# Patient Record
Sex: MALE | Race: BLACK OR AFRICAN AMERICAN | NOT HISPANIC OR LATINO | Employment: FULL TIME | URBAN - METROPOLITAN AREA
[De-identification: names, ages, dates, MRNs, and addresses within clinical notes are randomized per-mention and may not be internally consistent; named-entity substitution may affect disease eponyms.]

---
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
## 2023-09-28 ENCOUNTER — HOSPITAL ENCOUNTER (EMERGENCY)
Facility: HOSPITAL | Age: 18
Discharge: HOME | End: 2023-09-28
Attending: EMERGENCY MEDICINE

## 2023-09-28 VITALS
HEIGHT: 70 IN | BODY MASS INDEX: 20.04 KG/M2 | TEMPERATURE: 98 F | WEIGHT: 140 LBS | DIASTOLIC BLOOD PRESSURE: 74 MMHG | HEART RATE: 76 BPM | RESPIRATION RATE: 16 BRPM | OXYGEN SATURATION: 98 % | SYSTOLIC BLOOD PRESSURE: 116 MMHG

## 2023-09-28 DIAGNOSIS — S01.511A LIP LACERATION, INITIAL ENCOUNTER: ICD-10-CM

## 2023-09-28 DIAGNOSIS — S01.81XA FACIAL LACERATION, INITIAL ENCOUNTER: ICD-10-CM

## 2023-09-28 DIAGNOSIS — S09.90XA TRAUMATIC INJURY OF HEAD, INITIAL ENCOUNTER: ICD-10-CM

## 2023-09-28 DIAGNOSIS — S06.0X0A CONCUSSION WITHOUT LOSS OF CONSCIOUSNESS, INITIAL ENCOUNTER: ICD-10-CM
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
## 2023-09-28 DIAGNOSIS — S01.112A LEFT EYELID LACERATION, INITIAL ENCOUNTER: ICD-10-CM

## 2023-09-28 LAB
ALBUMIN SERPL BCP-MCNC: 5.2 G/DL (ref 3.5–5.2)
ALP SERPL-CCNC: 80 U/L (ref 55–135)
ALT SERPL W/O P-5'-P-CCNC: 9 U/L (ref 10–44)
ANION GAP SERPL CALC-SCNC: 8 MMOL/L (ref 8–16)
AST SERPL-CCNC: 15 U/L (ref 10–40)
BASOPHILS # BLD AUTO: 0.06 K/UL (ref 0–0.2)
BASOPHILS NFR BLD: 0.7 % (ref 0–1.9)
BILIRUB SERPL-MCNC: 0.5 MG/DL (ref 0.1–1)
BILIRUB UR QL STRIP: NEGATIVE
BUN SERPL-MCNC: 14 MG/DL (ref 6–20)
CALCIUM SERPL-MCNC: 9.8 MG/DL (ref 8.7–10.5)
CHLORIDE SERPL-SCNC: 107 MMOL/L (ref 95–110)
CLARITY UR: CLEAR
CO2 SERPL-SCNC: 24 MMOL/L (ref 23–29)
COLOR UR: YELLOW
CREAT SERPL-MCNC: 0.9 MG/DL (ref 0.5–1.4)
DIFFERENTIAL METHOD: NORMAL
EOSINOPHIL # BLD AUTO: 0.1 K/UL (ref 0–0.5)
EOSINOPHIL NFR BLD: 1.1 % (ref 0–8)
ERYTHROCYTE [DISTWIDTH] IN BLOOD BY AUTOMATED COUNT: 11.9 % (ref 11.5–14.5)
EST. GFR  (NO RACE VARIABLE): >60 ML/MIN/1.73 M^2
GLUCOSE SERPL-MCNC: 151 MG/DL (ref 70–110)
GLUCOSE UR QL STRIP: NEGATIVE
HCT VFR BLD AUTO: 47.9 % (ref 40–54)
HGB BLD-MCNC: 15.9 G/DL (ref 14–18)
HGB UR QL STRIP: NEGATIVE
IMM GRANULOCYTES # BLD AUTO: 0.03 K/UL (ref 0–0.04)
IMM GRANULOCYTES NFR BLD AUTO: 0.4 % (ref 0–0.5)
INR PPP: 1.2 (ref 0.8–1.2)
KETONES UR QL STRIP: ABNORMAL
LEUKOCYTE ESTERASE UR QL STRIP: NEGATIVE
LYMPHOCYTES # BLD AUTO: 4 K/UL (ref 1–4.8)
LYMPHOCYTES NFR BLD: 46.3 % (ref 18–48)
MCH RBC QN AUTO: 29.9 PG (ref 27–31)
MCHC RBC AUTO-ENTMCNC: 33.2 G/DL (ref 32–36)
MCV RBC AUTO: 90 FL (ref 82–98)
MONOCYTES # BLD AUTO: 0.5 K/UL (ref 0.3–1)
MONOCYTES NFR BLD: 5.3 % (ref 4–15)
NEUTROPHILS # BLD AUTO: 4 K/UL (ref 1.8–7.7)
NEUTROPHILS NFR BLD: 46.2 % (ref 38–73)
NITRITE UR QL STRIP: NEGATIVE
NRBC BLD-RTO: 0 /100 WBC
PH UR STRIP: 8 [PH] (ref 5–8)
PLATELET # BLD AUTO: 338 K/UL (ref 150–450)
PMV BLD AUTO: 10.4 FL (ref 9.2–12.9)
POTASSIUM SERPL-SCNC: 3.4 MMOL/L (ref 3.5–5.1)
PROT SERPL-MCNC: 8 G/DL (ref 6–8.4)
PROT UR QL STRIP: ABNORMAL
PROTHROMBIN TIME: 13.3 SEC (ref 9–12.5)
RBC # BLD AUTO: 5.31 M/UL (ref 4.6–6.2)
SODIUM SERPL-SCNC: 139 MMOL/L (ref 136–145)
SP GR UR STRIP: 1.02 (ref 1–1.03)
URN SPEC COLLECT METH UR: ABNORMAL
UROBILINOGEN UR STRIP-ACNC: ABNORMAL EU/DL
WBC # BLD AUTO: 8.56 K/UL (ref 3.9–12.7)

## 2023-09-28 PROCEDURE — 25000003 PHARM REV CODE 250

## 2023-09-28 PROCEDURE — 80053 COMPREHEN METABOLIC PANEL: CPT

## 2023-09-28 PROCEDURE — 90715 TDAP VACCINE 7 YRS/> IM: CPT

## 2023-09-28 PROCEDURE — 90471 IMMUNIZATION ADMIN: CPT

## 2023-09-28 PROCEDURE — 63600175 PHARM REV CODE 636 W HCPCS

## 2023-09-28 PROCEDURE — 85025 COMPLETE CBC W/AUTO DIFF WBC: CPT

## 2023-09-28 PROCEDURE — 96376 TX/PRO/DX INJ SAME DRUG ADON: CPT

## 2023-09-28 PROCEDURE — 85610 PROTHROMBIN TIME: CPT

## 2023-09-28 PROCEDURE — 12052 INTMD RPR FACE/MM 2.6-5.0 CM: CPT

## 2023-09-28 PROCEDURE — 96375 TX/PRO/DX INJ NEW DRUG ADDON: CPT

## 2023-09-28 PROCEDURE — 96374 THER/PROPH/DIAG INJ IV PUSH: CPT

## 2023-09-28 PROCEDURE — 81003 URINALYSIS AUTO W/O SCOPE: CPT

## 2023-09-28 PROCEDURE — 12053 INTMD RPR FACE/MM 5.1-7.5 CM: CPT

## 2023-09-28 PROCEDURE — 12011 RPR F/E/E/N/L/M 2.5 CM/<: CPT

## 2023-09-28 PROCEDURE — 99285 EMERGENCY DEPT VISIT HI MDM: CPT | Mod: 25

## 2023-09-28 RX ORDER — CEPHALEXIN 500 MG/1
500 CAPSULE ORAL 4 TIMES DAILY
Qty: 28 CAPSULE | Refills: 0 | Status: SHIPPED | OUTPATIENT
Start: 2023-09-28 | End: 2023-10-05

## 2023-09-28 RX ORDER — MORPHINE SULFATE 2 MG/ML
2 INJECTION, SOLUTION INTRAMUSCULAR; INTRAVENOUS
Status: COMPLETED | OUTPATIENT
Start: 2023-09-28 | End: 2023-09-28

## 2023-09-28 RX ORDER — MUPIROCIN 20 MG/G
OINTMENT TOPICAL 3 TIMES DAILY
Qty: 15 G | Refills: 0 | Status: SHIPPED | OUTPATIENT
Start: 2023-09-28 | End: 2023-10-03

## 2023-09-28 RX ORDER — KETOROLAC TROMETHAMINE 30 MG/ML
30 INJECTION, SOLUTION INTRAMUSCULAR; INTRAVENOUS
Status: DISCONTINUED | OUTPATIENT
Start: 2023-09-28 | End: 2023-09-28

## 2023-09-28 RX ORDER — LIDOCAINE HYDROCHLORIDE 10 MG/ML
10 INJECTION, SOLUTION EPIDURAL; INFILTRATION; INTRACAUDAL; PERINEURAL ONCE
Status: COMPLETED | OUTPATIENT
Start: 2023-09-28 | End: 2023-09-28

## 2023-09-28 RX ORDER — LIDOCAINE HYDROCHLORIDE AND EPINEPHRINE 10; 10 MG/ML; UG/ML
10 INJECTION, SOLUTION INFILTRATION; PERINEURAL
Status: DISCONTINUED | OUTPATIENT
Start: 2023-09-28 | End: 2023-09-28 | Stop reason: HOSPADM

## 2023-09-28 RX ORDER — BACITRACIN 500 [USP'U]/G
OINTMENT TOPICAL
Status: DISCONTINUED | OUTPATIENT
Start: 2023-09-28 | End: 2023-09-28 | Stop reason: HOSPADM

## 2023-09-28 RX ORDER — KETOROLAC TROMETHAMINE 60 MG/2ML
30 INJECTION, SOLUTION INTRAMUSCULAR ONCE
Status: DISCONTINUED | OUTPATIENT
Start: 2023-09-28 | End: 2023-09-28

## 2023-09-28 RX ORDER — ACETAMINOPHEN 500 MG
1000 TABLET ORAL
Status: COMPLETED | OUTPATIENT
Start: 2023-09-28 | End: 2023-09-28

## 2023-09-28 RX ORDER — KETOROLAC TROMETHAMINE 30 MG/ML
30 INJECTION, SOLUTION INTRAMUSCULAR; INTRAVENOUS ONCE
Status: DISCONTINUED | OUTPATIENT
Start: 2023-09-28 | End: 2023-09-28

## 2023-09-28 RX ORDER — ONDANSETRON 2 MG/ML
4 INJECTION INTRAMUSCULAR; INTRAVENOUS
Status: COMPLETED | OUTPATIENT
Start: 2023-09-28 | End: 2023-09-28

## 2023-09-28 RX ORDER — ERYTHROMYCIN 5 MG/G
OINTMENT OPHTHALMIC 4 TIMES DAILY
Qty: 3.5 G | Refills: 0 | Status: SHIPPED | OUTPATIENT
Start: 2023-09-28 | End: 2023-10-05

## 2023-09-28 RX ORDER — TETRACAINE HYDROCHLORIDE 5 MG/ML
2 SOLUTION OPHTHALMIC
Status: COMPLETED | OUTPATIENT
Start: 2023-09-28 | End: 2023-09-28

## 2023-09-28 RX ORDER — MORPHINE SULFATE 4 MG/ML
4 INJECTION, SOLUTION INTRAMUSCULAR; INTRAVENOUS
Status: COMPLETED | OUTPATIENT
Start: 2023-09-28 | End: 2023-09-28

## 2023-09-28 RX ADMIN — MORPHINE SULFATE 4 MG: 4 INJECTION, SOLUTION INTRAMUSCULAR; INTRAVENOUS at 12:09

## 2023-09-28 RX ADMIN — MORPHINE SULFATE 2 MG: 2 INJECTION, SOLUTION INTRAMUSCULAR; INTRAVENOUS at 08:09

## 2023-09-28 RX ADMIN — TETRACAINE HYDROCHLORIDE 2 DROP: 5 SOLUTION OPHTHALMIC at 02:09

## 2023-09-28 RX ADMIN — CLOSTRIDIUM TETANI TOXOID ANTIGEN (FORMALDEHYDE INACTIVATED), CORYNEBACTERIUM DIPHTHERIAE TOXOID ANTIGEN (FORMALDEHYDE INACTIVATED), BORDETELLA PERTUSSIS TOXOID ANTIGEN (GLUTARALDEHYDE INACTIVATED), BORDETELLA PERTUSSIS FILAMENTOUS HEMAGGLUTININ ANTIGEN (FORMALDEHYDE INACTIVATED), BORDETELLA PERTUSSIS PERTACTIN ANTIGEN, AND BORDETELLA PERTUSSIS FIMBRIAE 2/3 ANTIGEN 0.5 ML: 5; 2; 2.5; 5; 3; 5 INJECTION, SUSPENSION INTRAMUSCULAR at 03:09

## 2023-09-28 RX ADMIN — ACETAMINOPHEN 1000 MG: 500 TABLET ORAL at 12:09

## 2023-09-28 RX ADMIN — ONDANSETRON HYDROCHLORIDE 4 MG: 2 SOLUTION INTRAMUSCULAR; INTRAVENOUS at 08:09

## 2023-09-28 RX ADMIN — LIDOCAINE HYDROCHLORIDE 100 MG: 10 INJECTION, SOLUTION EPIDURAL; INFILTRATION; INTRACAUDAL; PERINEURAL at 01:09

## 2023-09-28 NOTE — ED PROVIDER NOTES
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 and/or Complexity of Data Reviewed  Labs: ordered.     Details: CBC WBC 8.56, RBC 5.31, hemoglobin 15.9, hematocrit 47.9, potassium 3.4, glucose 151, ALT 9, AST 15, PT 13.3, INR 1.2, UA significant for trace protein, trace ketones, urobilinogen 2.0-3.0, negative leukocytes, negative occult blood  Radiology: ordered.     Details: CT head significant for IMPRESSION: No acute intracranial process.  CT maxillofacial significant for IMPRESSION: 1. Multifocal soft tissue swelling around the left globe/orbit, lips and nose with radiopaque subcutaneous soft tissue debris/foreign bodies as above. 2. No acute displaced fracture identified.  CT cervical spine significant for IMPRESSION: No acute fracture or traumatic malalignment in the cervical spine.  X-ray chest significant for no acute cardiopulmonary process  X-ray left hand and left knee significant for no acute osseous findings  X-ray right hand significant for IMPRESSION: No acute osseous abnormalities. Deformity of the fifth metacarpal secondary to prior trauma     Risk  OTC drugs.  Prescription drug management.                               Clinical Impression:   Final diagnoses:  [W19.XXXA] Fall, initial encounter (Primary)  [S09.90XA]  Traumatic injury of head, initial encounter  [S01.81XA] Facial laceration, initial encounter  [S01.511A] Lip laceration, initial encounter  [S01.112A] Left eyelid laceration, initial encounter  [S06.0X0A] Concussion without loss of consciousness, initial encounter        ED Disposition Condition    Discharge Stable          ED Prescriptions       Medication Sig Dispense Start Date End Date Auth. Provider    cephALEXin (KEFLEX) 500 MG capsule Take 1 capsule (500 mg total) by mouth 4 (four) times daily. for 7 days 28 capsule 9/28/2023 10/5/2023 Gladis Sanchez NP    mupirocin (BACTROBAN) 2 % ointment Apply topically 3 (three) times daily. for 5 days 15 g 9/28/2023 10/3/2023 Gladis Sanchez NP    erythromycin (ROMYCIN) ophthalmic ointment Place into the left eye 4 (four) times daily. Place a 1/2 inch ribbon of ointment into the lower eyelid. for 7 days 3.5 g 9/28/2023 10/5/2023 Gladis Sanchez NP          Follow-up Information       Follow up With Specialties Details Why Contact Info Additional Information    [facility]  Schedule an appointment as soon as possible for a visit  For primary care, For recheck/continuing care [address] [address]  [phone]       [facility]  Schedule an appointment as soon as possible for a visit  For recheck/continuing care [address] [address]  [phone]     [facility] - Emergency Dept Emergency Medicine  If symptoms worsen [address] [place] [address] [phone] 1st floor             Gladis Sanchez NP  09/28/23 9997       Gladis Sanchez NP  09/29/23 3574

## 2023-09-28 NOTE — Clinical Note
"Tod Schulz" Beata was seen and treated in our emergency department on 9/28/2023.  He may return to work on 10/03/2023.       If you have any questions or concerns, please don't hesitate to call.      Gladis Sanchez NP"

## 2023-09-28 NOTE — DISCHARGE INSTRUCTIONS
Alternate Tylenol and ibuprofen as needed for pain.  Please take antibiotics as prescribed until gone.  Please apply antibacterial eyedrops into your left eye.  Apply topical Bactroban ointment to your abrasions and wounds.  Please follow up with [facility] for primary care and rechecked.  Please follow up with Ophthalmology at [facility] [place] [address] for your eye.  Your sutures will need to be removed in 5-7 days.  Please return to the ED for worsening pain, bleeding that will not stop, redness or swelling around any of your wounds, pus drainage from the wounds, fever, changes in vision, headaches, passing out, or any new or worsening concerns.

## 2024-11-01 ENCOUNTER — HOSPITAL ENCOUNTER (EMERGENCY)
Facility: HOSPITAL | Age: 19
Discharge: HOME | End: 2024-11-01
Attending: STUDENT IN AN ORGANIZED HEALTH CARE EDUCATION/TRAINING PROGRAM

## 2024-11-01 VITALS
HEART RATE: 68 BPM | RESPIRATION RATE: 18 BRPM | WEIGHT: 140 LBS | HEIGHT: 70 IN | OXYGEN SATURATION: 99 % | SYSTOLIC BLOOD PRESSURE: 130 MMHG | TEMPERATURE: 98 F | BODY MASS INDEX: 20.04 KG/M2 | DIASTOLIC BLOOD PRESSURE: 80 MMHG

## 2024-11-01 DIAGNOSIS — R52 PAIN: ICD-10-CM

## 2024-11-01 DIAGNOSIS — S16.1XXA CERVICAL STRAIN, ACUTE, INITIAL ENCOUNTER: Primary | ICD-10-CM

## 2024-11-01 PROCEDURE — 99283 EMERGENCY DEPT VISIT LOW MDM: CPT | Mod: 25

## 2024-11-01 NOTE — ED PROVIDER NOTES
Encounter Date: 2024       History     Chief Complaint   Patient presents with    Back Pain     X 1 MOS    AXILLA PAIN     BILAT     19-year-old male presents emergency department with complaint of neck pain.  Patient states that 1 month ago he was bending his neck down playing a video game in his had neck pain since that time.  Patient reports bending his neck makes the pain worse, or turning his head.  He has not really tried any over-the-counter medications he has not seen any medical professional prior to this visit.  Patient denies any weakness to his upper extremities or numbness or tingling    The history is provided by the patient.     Review of patient's allergies indicates:  No Known Allergies  Past Medical History:   Diagnosis Date    ADHD     ADHD (attention deficit hyperactivity disorder)     H/O umbilical hernia repair     at age 1     Past Surgical History:   Procedure Laterality Date    CIRCUMCISION, NON-       Family History   Problem Relation Name Age of Onset    No Known Problems Mother      No Known Problems Father      Hypertension Maternal Grandmother      Diabetes Maternal Grandmother      Congenital heart disease Neg Hx      Heart attacks under age 50 Neg Hx      Sudden death Neg Hx       Social History     Tobacco Use    Smoking status: Never    Smokeless tobacco: Never   Substance Use Topics    Alcohol use: Never    Drug use: Yes     Types: Marijuana     Review of Systems   Constitutional: Negative.    Respiratory: Negative.     Cardiovascular: Negative.    Genitourinary: Negative.    Musculoskeletal:  Positive for neck pain.   Neurological:  Negative for dizziness, tremors, seizures, syncope, facial asymmetry, speech difficulty, weakness, light-headedness, numbness and headaches.   Psychiatric/Behavioral: Negative.     All other systems reviewed and are negative.      Physical Exam     Initial Vitals [24 1113]   BP Pulse Resp Temp SpO2   129/85 65 16 98.2 °F (36.8 °C) 100  %      MAP       --         Physical Exam    Nursing note and vitals reviewed.  Constitutional: He appears well-developed and well-nourished.   HENT:   Head: Normocephalic and atraumatic.   Neck:       Has no midline tenderness on physical exam he has tenderness with palpation to the right lateral aspect of his neck that extends to the trapezius.  The patient's pain is reproducible with movement, or palpation.  He has equal upper extremity hand , he has no weakness his DTRs are intact.   Cardiovascular:  Normal rate, regular rhythm, normal heart sounds and intact distal pulses.           Pulmonary/Chest: Breath sounds normal.   Musculoskeletal:      Cervical back: No edema, erythema or rigidity. Muscular tenderness present. No spinous process tenderness. Decreased range of motion.     Neurological: He is alert and oriented to person, place, and time.         ED Course   Procedures  Labs Reviewed - No data to display       Imaging Results              X-Ray Cervical Spine Complete 5 view (Final result)  Result time 11/01/24 12:44:03      Final result by Mikael Smalls MD (11/01/24 12:44:03)                   Impression:      Negative cervical spine radiographs.      Electronically signed by: Mikael Smalls  Date:    11/01/2024  Time:    12:44               Narrative:    EXAMINATION:  XR CERVICAL SPINE COMPLETE 5 VIEW    CLINICAL HISTORY:  Neck pain, upper back pain, unspecified    FINDINGS:  Five views of the cervical spine with no prior studies for comparison show normal curvature and vertebral body alignment, with normal vertebral heights, and no acute fractures or destructive osseous lesions.    The intervertebral disc spaces are preserved. There is no significant facet or uncinate arthropathy. The odontoid, lateral masses and prevertebral soft tissues are normal, with normal bone mineralization.                                       Medications - No data to display  Medical Decision Making  19-year-old male  presents emergency department with complaint of neck pain.  Patient states that 1 month ago he was bending his neck down playing a video game in his had neck pain since that time.  Patient reports bending his neck makes the pain worse, or turning his head.  He has not really tried any over-the-counter medications he has not seen any medical professional prior to this visit.  Patient denies any weakness to his upper extremities or numbness or tingling    The history is provided by the patient.     Considerations include cervical strain, cervical radiculopathy, musculoskeletal pain    19-year-old patient presents emergency department with lingering pain to the right lateral aspect of his neck after bending down playing a video game.  At the time of my evaluation patient was noted to being in this same position on his cell phone he states that he does use his cell phone and play games often.  He has neuro exam is grossly intact he has no midline tenderness on physical examPhysical Exam   Has no midline tenderness on physical exam he has tenderness with palpation to the right lateral aspect of his neck that extends to the trapezius.  The patient's pain is reproducible with movement, or palpation.  He has equal upper extremity hand , he has no weakness .  Cervical spine imaging performed with no acute abnormalities.  Patient will be discharged home instructed on ice, Motrin for pain and swelling, given return precautions.      Amount and/or Complexity of Data Reviewed  Radiology: ordered. Decision-making details documented in ED Course.                                      Clinical Impression:  Final diagnoses:  [R52] Pain  [S16.1XXA] Cervical strain, acute, initial encounter (Primary)          ED Disposition Condition    Discharge Stable          ED Prescriptions    None       Follow-up Information       Follow up With Specialties Details Why Contact Info    Edel Cruz, NP General Practice Schedule an  appointment as soon as possible for a visit in 2 days  [address] [address] [address]  [phone]               Chelle Payton, JOE  11/01/24 3271

## 2024-11-01 NOTE — Clinical Note
"Tod Schulz" Beata was seen and treated in our emergency department on 11/1/2024.  He may return to work on 11/02/2024.       If you have any questions or concerns, please don't hesitate to call.      Lilia BUSTOS RN    "

## 2024-11-01 NOTE — DISCHARGE INSTRUCTIONS
Motrin or Tylenol for pain   Ice every 2 hours for 20 minutes   Follow up as directed   Return for any concerns   If pain is persistent you may need further outpatient testing and or physical therapy

## 2024-11-21 ENCOUNTER — HOSPITAL ENCOUNTER (EMERGENCY)
Facility: HOSPITAL | Age: 19
Discharge: HOME | End: 2024-11-21
Attending: EMERGENCY MEDICINE

## 2024-11-21 VITALS
RESPIRATION RATE: 18 BRPM | SYSTOLIC BLOOD PRESSURE: 131 MMHG | OXYGEN SATURATION: 99 % | BODY MASS INDEX: 20.04 KG/M2 | DIASTOLIC BLOOD PRESSURE: 60 MMHG | HEIGHT: 70 IN | TEMPERATURE: 98 F | WEIGHT: 140 LBS | HEART RATE: 94 BPM

## 2024-11-21 DIAGNOSIS — J06.9 VIRAL URI WITH COUGH: Primary | ICD-10-CM

## 2024-11-21 LAB
GROUP A STREP, MOLECULAR: NEGATIVE
HCV AB SERPL QL IA: NEGATIVE
HIV 1+2 AB+HIV1 P24 AG SERPL QL IA: NEGATIVE
INFLUENZA A, MOLECULAR: NEGATIVE
INFLUENZA B, MOLECULAR: NEGATIVE
SARS-COV-2 RDRP RESP QL NAA+PROBE: NEGATIVE
SPECIMEN SOURCE: NORMAL

## 2024-11-21 PROCEDURE — 86803 HEPATITIS C AB TEST: CPT | Performed by: EMERGENCY MEDICINE

## 2024-11-21 PROCEDURE — 87635 SARS-COV-2 COVID-19 AMP PRB: CPT | Performed by: NURSE PRACTITIONER

## 2024-11-21 PROCEDURE — 87389 HIV-1 AG W/HIV-1&-2 AB AG IA: CPT | Performed by: EMERGENCY MEDICINE

## 2024-11-21 PROCEDURE — 87502 INFLUENZA DNA AMP PROBE: CPT | Performed by: NURSE PRACTITIONER

## 2024-11-21 PROCEDURE — 99283 EMERGENCY DEPT VISIT LOW MDM: CPT

## 2024-11-21 PROCEDURE — 87651 STREP A DNA AMP PROBE: CPT | Performed by: NURSE PRACTITIONER

## 2024-11-21 NOTE — ED NOTES
Bed: [facility] 01 - C Chair  Expected date:   Expected time:   Means of arrival:   Comments:

## 2024-11-21 NOTE — Clinical Note
"Tod Schulz" Beata was seen and treated in our emergency department on 11/21/2024.  He may return to work on 11/22/2024.       If you have any questions or concerns, please don't hesitate to call.      Palmira Gillis NP"

## 2024-11-21 NOTE — ED PROVIDER NOTES
Encounter Date: 2024       History     Chief Complaint   Patient presents with    Cough    Chills    Sore Throat     Started last night    Generalized Body Aches     Tod Cota is a 19 year old male with pmh ADHD presenting to the ED with c/o sore throat, body aches, cough, congestion, and chills that began last night. He has taken OTC medications with improvement of symptoms. Denies vomiting and has been able to tolerate PO intake.       Review of patient's allergies indicates:  No Known Allergies  Past Medical History:   Diagnosis Date    ADHD     ADHD (attention deficit hyperactivity disorder)     H/O umbilical hernia repair     at age 1     Past Surgical History:   Procedure Laterality Date    CIRCUMCISION, NON-       Family History   Problem Relation Name Age of Onset    No Known Problems Mother      No Known Problems Father      Hypertension Maternal Grandmother      Diabetes Maternal Grandmother      Congenital heart disease Neg Hx      Heart attacks under age 50 Neg Hx      Sudden death Neg Hx       Social History     Tobacco Use    Smoking status: Never    Smokeless tobacco: Never   Substance Use Topics    Alcohol use: Never    Drug use: Yes     Types: Marijuana     Review of Systems   Constitutional:  Positive for chills. Negative for fever.   HENT:  Positive for congestion and sore throat.    Respiratory:  Positive for cough. Negative for shortness of breath.    Cardiovascular:  Negative for chest pain.   Gastrointestinal:  Negative for nausea and vomiting.   Genitourinary:  Negative for dysuria.   Musculoskeletal:  Positive for myalgias. Negative for back pain.   Skin:  Negative for rash.   Neurological:  Negative for weakness.   Hematological:  Does not bruise/bleed easily.       Physical Exam     Initial Vitals [24 1038]   BP Pulse Resp Temp SpO2   117/75 95 18 98.3 °F (36.8 °C) 99 %      MAP       --         Physical Exam    Nursing note and vitals reviewed.  Constitutional: He  appears well-developed and well-nourished. He is not diaphoretic. No distress.   HENT:   Head: Normocephalic and atraumatic.   Right Ear: Tympanic membrane normal.   Left Ear: Tympanic membrane normal. Mouth/Throat: Oropharynx is clear and moist.   Eyes: Conjunctivae are normal.   Neck: Neck supple.   Cardiovascular:  Normal rate, regular rhythm, normal heart sounds and intact distal pulses.     Exam reveals no gallop and no friction rub.       No murmur heard.  Pulmonary/Chest: Breath sounds normal. He has no wheezes. He has no rhonchi. He has no rales.   Abdominal: Abdomen is soft. He exhibits no distension. There is no abdominal tenderness.   Musculoskeletal:         General: Normal range of motion.      Cervical back: Neck supple.     Neurological: He is alert and oriented to person, place, and time.   Skin: No rash noted. No erythema.         ED Course   Procedures  Labs Reviewed   GROUP A STREP, MOLECULAR       Result Value    Group A Strep, Molecular Negative     INFLUENZA A AND B ANTIGEN    Influenza A, Molecular Negative      Influenza B, Molecular Negative      Flu A & B Source Nasal swab      Narrative:     Specimen Source->Nasopharyngeal Swab   SARS-COV-2 RNA AMPLIFICATION, QUAL    SARS-CoV-2 RNA, Amplification, Qual Negative     HEPATITIS C ANTIBODY   HIV 1 / 2 ANTIBODY          Imaging Results    None          Medications - No data to display  Medical Decision Making  This is an urgent evaluation of a 19 year old male presenting to the ED with upper respiratory symptoms that began yesterday. COVID, flu, and strep all negative. The patient appears to have a viral upper respiratory infection with a viral syndrome.  Based upon the history and physical exam the patient does not appear to have a serious bacterial infection such as pneumonia, sepsis, otitis media, bacterial sinusitis, strep pharyngitis, parapharyngeal or peritonsillar abscess, meningitis.  I do not think the patient needs antibiotics as  their illness likely has a viral etiology.  Patient appears very well and I have given specific return precautions to the patient and/or family members.  I have instructed the patient to hydrate, take over the counter medications and follow up with their regular doctor or the one provided. Based on my clinical evaluation, I do not appreciate any immediate, emergent, or life threatening condition or etiology that warrants additional workup today and feel that the patient can be discharged with close follow up care.           Amount and/or Complexity of Data Reviewed  Labs: ordered. Decision-making details documented in ED Course.                                      Clinical Impression:  Final diagnoses:  [J06.9] Viral URI with cough (Primary)          ED Disposition Condition    Discharge Stable          ED Prescriptions    None       Follow-up Information       Follow up With Specialties Details Why Contact Info Additional Information    [facility] - Emergency Dept Emergency Medicine  As needed, If symptoms worsen [address] [facility] [address]  [phone] 1st floor    dEel Cruz, NP General Practice, Family Medicine Schedule an appointment as soon as possible for a visit  As needed [address] [place] [address]  [phone]                Palmira Gillis NP  11/21/24 4538

## 2024-12-10 ENCOUNTER — HOSPITAL ENCOUNTER (EMERGENCY)
Facility: HOSPITAL | Age: 19
Discharge: HOME | End: 2024-12-10
Attending: EMERGENCY MEDICINE

## 2024-12-10 VITALS
BODY MASS INDEX: 20.04 KG/M2 | SYSTOLIC BLOOD PRESSURE: 114 MMHG | WEIGHT: 140 LBS | TEMPERATURE: 98 F | OXYGEN SATURATION: 100 % | DIASTOLIC BLOOD PRESSURE: 71 MMHG | HEART RATE: 75 BPM | RESPIRATION RATE: 18 BRPM | HEIGHT: 70 IN

## 2024-12-10 DIAGNOSIS — S29.012A STRAIN OF THORACIC BACK REGION: Primary | ICD-10-CM

## 2024-12-10 PROCEDURE — 99283 EMERGENCY DEPT VISIT LOW MDM: CPT | Mod: 25

## 2024-12-10 PROCEDURE — 25000003 PHARM REV CODE 250

## 2024-12-10 RX ORDER — METHOCARBAMOL 500 MG/1
500 TABLET, FILM COATED ORAL 3 TIMES DAILY
Qty: 15 TABLET | Refills: 0 | Status: SHIPPED | OUTPATIENT
Start: 2024-12-10 | End: 2024-12-15

## 2024-12-10 RX ORDER — IBUPROFEN 200 MG
600 TABLET ORAL
Status: COMPLETED | OUTPATIENT
Start: 2024-12-10 | End: 2024-12-10

## 2024-12-10 RX ADMIN — IBUPROFEN 600 MG: 200 TABLET, FILM COATED ORAL at 06:12

## 2024-12-10 NOTE — Clinical Note
"Tod Pereyra (Ian)ight was seen and treated in our emergency department on 12/10/2024.  He may return to work on 12/13/2024.       If you have any questions or concerns, please don't hesitate to call.      Gladis Sanchez NP"

## 2024-12-11 NOTE — ED PROVIDER NOTES
Encounter Date: 12/10/2024       History     Chief Complaint   Patient presents with    Back Pain     Neck and back pain for 3 months.  Upper back hurt originally when turning to the right side and now it is hurting when turning his neck to the left side.  Patient came a month ago because the pain had not stopped.  He is back today because the pain has gotten worse.  Pain is a burning sensation.    Neck Pain     Patient is a 19 y.o. male with past medical history of ADHD who presents to ED via self for concern for back pain which began 3 month(s) ago.  Patient reports he has been having right upper back pain x3 months.  Patient reports it hurts with movement and turning his neck.  Patient reports he also has been having pain in the right side of his neck which he was seen in the emergency department for about a month ago.  Patient reports he takes ibuprofen and soaks in a warm tub which will help decrease his pain but nothing that has made the pain completely go away.  Patient denies injuries or falls.  Patient reports he does lift heavy things for work.  Patient denies chest pain or shortness breath.  Patient denies any cough congestion runny nose or fever.  Patient reports he is smokes marijuana and cigarettes.  Patient is awake and alert in no acute distress.         Review of patient's allergies indicates:  No Known Allergies  Past Medical History:   Diagnosis Date    ADHD     ADHD (attention deficit hyperactivity disorder)     H/O umbilical hernia repair     at age 1     Past Surgical History:   Procedure Laterality Date    CIRCUMCISION, NON-       Family History   Problem Relation Name Age of Onset    No Known Problems Mother      No Known Problems Father      Hypertension Maternal Grandmother      Diabetes Maternal Grandmother      Congenital heart disease Neg Hx      Heart attacks under age 50 Neg Hx      Sudden death Neg Hx       Social History     Tobacco Use    Smoking status: Never    Smokeless  tobacco: Never   Substance Use Topics    Alcohol use: Never    Drug use: Yes     Types: Marijuana     Review of Systems   Constitutional: Negative.  Negative for fever.   Respiratory: Negative.  Negative for shortness of breath.    Cardiovascular: Negative.  Negative for chest pain.   Gastrointestinal: Negative.    Genitourinary: Negative.    Musculoskeletal:  Positive for myalgias and neck pain. Negative for arthralgias, back pain, gait problem, joint swelling and neck stiffness.   Skin: Negative.  Negative for color change, pallor and rash.   Neurological: Negative.  Negative for dizziness, tremors, seizures, syncope, facial asymmetry, speech difficulty, weakness, light-headedness, numbness and headaches.   Hematological:  Does not bruise/bleed easily.       Physical Exam     Initial Vitals [12/10/24 1741]   BP Pulse Resp Temp SpO2   114/71 75 18 97.8 °F (36.6 °C) 100 %      MAP       --         Physical Exam    Nursing note and vitals reviewed.  Constitutional: He appears well-developed and well-nourished. He is not diaphoretic. No distress.   HENT:   Head: Normocephalic and atraumatic.   Right Ear: External ear normal.   Left Ear: External ear normal.   Eyes: EOM are normal.   Neck: There are no signs of injury. No crepitus.   Normal range of motion.  Cardiovascular:  Normal rate, regular rhythm, normal heart sounds and intact distal pulses.     Exam reveals no gallop and no friction rub.       No murmur heard.  Pulmonary/Chest: Breath sounds normal. No respiratory distress. He has no wheezes. He has no rhonchi. He has no rales. He exhibits no tenderness.   Musculoskeletal:         General: Normal range of motion.      Cervical back: Normal range of motion. No swelling, edema, deformity, erythema, signs of trauma, rigidity, spasms, torticollis, tenderness, bony tenderness or crepitus. Pain with movement present. No spinous process tenderness or muscular tenderness. Normal range of motion.      Thoracic back:  Tenderness present. No swelling, edema, deformity, signs of trauma, lacerations or bony tenderness. Normal range of motion.        Back:      Neurological: He is alert and oriented to person, place, and time. He has normal strength. GCS score is 15. GCS eye subscore is 4. GCS verbal subscore is 5. GCS motor subscore is 6.   Skin: Skin is warm and dry. Capillary refill takes less than 2 seconds.   Psychiatric: He has a normal mood and affect. His behavior is normal. Judgment and thought content normal.         ED Course   Procedures  Labs Reviewed - No data to display       Imaging Results              X-Ray Thoracic Spine AP Lateral (Final result)  Result time 12/10/24 19:16:28   Procedure changed from X-Ray Thoracic Spine 4 or more views     Final result by Carlos Minor MD (12/10/24 19:16:28)                   Impression:      No definite radiographic evidence of acute displaced fracture or traumatic subluxation of the thoracic spine.  Further evaluation and follow-up as warranted.      Electronically signed by: Carlos Minor MD  Date:    12/10/2024  Time:    19:16               Narrative:    EXAMINATION:  XR THORACIC SPINE AP LATERAL    CLINICAL HISTORY:  upper back pain;    TECHNIQUE:  AP and lateral views of the thoracic spine were performed.    COMPARISON:  None    FINDINGS:  Thoracic spine alignment appears within normal limits.  Thoracic vertebral body heights appear adequately maintained without significant compression fracture appreciated.  Intervertebral disc spaces appear relatively well maintained.  No confluent airspace consolidation appreciated throughout the lungs.                                       Medications   ibuprofen tablet 600 mg (600 mg Oral Given 12/10/24 1831)     Medical Decision Making  MDM    Patient presents for emergent evaluation of acute upper back pain that poses a possible threat to life and/or bodily function.    Differential diagnosis included but not limited to  musculoskeletal pain, strain, fracture, dislocation, subluxation, cervical radiculopathy, cardiac equivalent, pulmonary cause.  In the ED patient found to have acute pain to palpation of the right side of the upper back without pain to palpation of the thoracic spine.  Patient has a no cervical spine tenderness or muscle tenderness of the cervical area.  Patient has normal range of motion of the neck without stiffness.  Patient has no pain to palpation over the lumbar spine or lower back.  Patient denies chest pain or shortness of breath.  Patient is awake and alert in no acute distress.  Patient has clear lung sounds bilaterally with no increased work of breathing.  Patient has normal strength in bilateral upper with normal cap refill and sensation distally.  Patient denies numbness or tingling.  Patient denies headaches or lightheaded/dizziness..    X-ray thoracic spine significant for no definitive radiographic evidence of acute displaced fracture or traumatic subluxation of the thoracic spine.    On palpation over the back it appears to be more muscular pain.  I do not appreciate any acute emergent process at this time and patient is stable for discharge home with close follow up with primary care.  Patient given prescription for muscle relaxers.      Discharge MDM  I discussed the patient presentation, X-rays, findings with ED attending Dr. Capone.    Patient was managed in the ED with oral ibuprofen.    The response to treatment was good.    Patient was discharged in stable condition with close follow up.  Detailed return precautions discussed to return to the ED for any new or worsening concerns.  Patient verbalizes understanding.    NP uses Epic and voice recognition software prone to occasional and minor errors that may persist in the medical record.      Amount and/or Complexity of Data Reviewed  Radiology: ordered. Decision-making details documented in ED Course.    Risk  OTC drugs.  Prescription drug  management.               ED Course as of 12/11/24 1422   e Dec 10, 2024   2007 X-Ray Thoracic Spine AP Lateral  Impression:     No definite radiographic evidence of acute displaced fracture or traumatic subluxation of the thoracic spine   [MP]      ED Course User Index  [MP] Gladis Sanchez NP                           Clinical Impression:  Final diagnoses:  [S29.012A] Strain of thoracic back region (Primary)          ED Disposition Condition    Discharge Stable          ED Prescriptions       Medication Sig Dispense Start Date End Date Auth. Provider    methocarbamoL (ROBAXIN) 500 MG Tab Take 1 tablet (500 mg total) by mouth 3 (three) times daily. for 5 days 15 tablet 12/10/2024 12/15/2024 Gladis Sanchez NP          Follow-up Information       Follow up With Specialties Details Why Contact Info Additional Information    [facility]  Schedule an appointment as soon as possible for a visit  For primary care, For recheck/continuing care [address]  [facility] [address]  [phone]       [facility] - Emergency Dept Emergency Medicine  If symptoms worsen [address]  [facility] [address]  [phone] 1st floor             Gladis Sanchez NP  12/11/24 1425

## 2024-12-11 NOTE — DISCHARGE INSTRUCTIONS
Alternate Tylenol and ibuprofen as needed for pain.  Take the muscle relaxer as prescribed.  Please follow up with [facility] for primary care and further evaluation and treatment.  You can try heat to your back to help decrease pain.    Please return to the ED for worsening back pain, chest pain, shortness breath, difficulty breathing, worsening neck pain, neck stiffness, fever, arm weakness, numbness or tingling in your arms, or any new or worsening concerns.

## 2024-12-17 ENCOUNTER — HOSPITAL ENCOUNTER (EMERGENCY)
Facility: HOSPITAL | Age: 19
Discharge: HOME | End: 2024-12-17
Attending: EMERGENCY MEDICINE

## 2024-12-17 VITALS
OXYGEN SATURATION: 99 % | SYSTOLIC BLOOD PRESSURE: 124 MMHG | WEIGHT: 140 LBS | HEART RATE: 62 BPM | TEMPERATURE: 98 F | RESPIRATION RATE: 18 BRPM | HEIGHT: 70 IN | DIASTOLIC BLOOD PRESSURE: 78 MMHG | BODY MASS INDEX: 20.04 KG/M2

## 2024-12-17 DIAGNOSIS — S39.012A BACK STRAIN, INITIAL ENCOUNTER: ICD-10-CM

## 2024-12-17 DIAGNOSIS — S16.1XXA STRAIN OF NECK MUSCLE, INITIAL ENCOUNTER: ICD-10-CM

## 2024-12-17 DIAGNOSIS — S39.012A STRAIN OF LUMBAR REGION, INITIAL ENCOUNTER: Primary | ICD-10-CM

## 2024-12-17 PROCEDURE — 99284 EMERGENCY DEPT VISIT MOD MDM: CPT

## 2024-12-17 PROCEDURE — 25000003 PHARM REV CODE 250: Performed by: NURSE PRACTITIONER

## 2024-12-17 RX ORDER — DICLOFENAC SODIUM 50 MG/1
50 TABLET, DELAYED RELEASE ORAL 3 TIMES DAILY PRN
Qty: 20 TABLET | Refills: 2 | Status: SHIPPED | OUTPATIENT
Start: 2024-12-17

## 2024-12-17 RX ORDER — IBUPROFEN 400 MG/1
800 TABLET ORAL
Status: COMPLETED | OUTPATIENT
Start: 2024-12-17 | End: 2024-12-17

## 2024-12-17 RX ORDER — METHOCARBAMOL 500 MG/1
500 TABLET, FILM COATED ORAL 3 TIMES DAILY
Qty: 15 TABLET | Refills: 0 | Status: SHIPPED | OUTPATIENT
Start: 2024-12-17 | End: 2024-12-22

## 2024-12-17 RX ORDER — METHOCARBAMOL 500 MG/1
500 TABLET, FILM COATED ORAL
Status: COMPLETED | OUTPATIENT
Start: 2024-12-17 | End: 2024-12-17

## 2024-12-17 RX ADMIN — IBUPROFEN 800 MG: 400 TABLET, FILM COATED ORAL at 05:12

## 2024-12-17 RX ADMIN — METHOCARBAMOL 500 MG: 500 TABLET ORAL at 05:12

## 2024-12-17 NOTE — DISCHARGE INSTRUCTIONS
Soak in warm Epsom salt.  Take your medication as prescribed I have placed an ambulatory referral to [facility].  This has been your primary care doctor.  Return to the ED for any worsening of symptoms or any other concerns.

## 2024-12-17 NOTE — ED PROVIDER NOTES
Encounter Date: 2024       History     Chief Complaint   Patient presents with    Back Pain     Right sided back pain 9/10 between shoulder blades after picking a box upon returning to work today.     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  He denies injury.  Three months ago he started with upper back pain around the trapezius muscles.  He denies injury.  Patient is on Suboxone.  He has taken ibuprofen for the pain at home.  It does help relieve the pain but comes back.  He needs a work excuse.        Review of patient's allergies indicates:  No Known Allergies  Past Medical History:   Diagnosis Date    ADHD     ADHD (attention deficit hyperactivity disorder)     H/O umbilical hernia repair     at age 1     Past Surgical History:   Procedure Laterality Date    CIRCUMCISION, NON-       Family History   Problem Relation Name Age of Onset    No Known Problems Mother      No Known Problems Father      Hypertension Maternal Grandmother      Diabetes Maternal Grandmother      Congenital heart disease Neg Hx      Heart attacks under age 50 Neg Hx      Sudden death Neg Hx       Social History     Tobacco Use    Smoking status: Some Days     Types: Cigarettes    Smokeless tobacco: Never   Substance Use Topics    Alcohol use: Never    Drug use: Yes     Types: Marijuana     Review of Systems   Constitutional:  Negative for fever.   Respiratory:  Negative for cough, shortness of breath and wheezing.    Cardiovascular:  Negative for chest pain, palpitations and leg swelling.   Gastrointestinal:  Negative for abdominal pain, diarrhea, nausea and vomiting.   Musculoskeletal:  Positive for back pain. Negative for gait problem, joint swelling and neck pain.   Skin:  Negative for rash.   Neurological:  Negative for weakness and headaches.       Physical Exam     Initial Vitals [24 1641]   BP Pulse Resp Temp SpO2   131/75  (!) 57 18 98.1 °F (36.7 °C) 99 %      MAP       --         Physical Exam    Constitutional: He appears well-developed and well-nourished. No distress.   HENT:   Head: Normocephalic. Mouth/Throat: Oropharynx is clear and moist.   Eyes: Conjunctivae are normal.   Neck: Neck supple.   There is no bony tenderness.  Patient has full range of motion to his neck.   Normal range of motion.  Cardiovascular:  Normal rate and regular rhythm.           Pulmonary/Chest: Breath sounds normal. No respiratory distress.   Abdominal: Abdomen is soft. Bowel sounds are normal. He exhibits no distension. There is no abdominal tenderness.   Musculoskeletal:         General: Normal range of motion.      Cervical back: Normal range of motion and neck supple.      Comments: Patient is ambulatory per self his gait is steady.  He moves all extremities without difficulty.  He does have tenderness bilateral trapezius muscles with palpation.     Neurological: He is alert and oriented to person, place, and time. No sensory deficit. GCS score is 15. GCS eye subscore is 4. GCS verbal subscore is 5. GCS motor subscore is 6.   Skin: Skin is warm. Capillary refill takes less than 2 seconds.   Psychiatric: He has a normal mood and affect. Thought content normal.         ED Course   Procedures  Labs Reviewed - No data to display       Imaging Results    None          Medications   ibuprofen tablet 800 mg (800 mg Oral Given 12/17/24 1736)   methocarbamoL tablet 500 mg (500 mg Oral Given 12/17/24 1736)     Medical Decision Making  Presents with upper back pain.  Onset about 3 months ago.  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  He is currently on Suboxone.  He has taken ibuprofen with some relief but he is reports pain does come back.  He denies injury.    Amount and/or Complexity of Data Reviewed  Discussion of management or test interpretation with  external provider(s): This patient has muscle spasms.  I have instructed him to soak in warm Epsom salt.  He was given ibuprofen and Robaxin here in the ED.  I have written him a prescription for diclofenac along with Robaxin for home use.  He wants a work excuse.  I did give him a work excuse until Monday.  I have explained to him that if he will soak in warm Epsom salts nightly after work that this will help ease some of the pain in those muscles.  He is to take his medication as prescribed.  I gave him an ambulatory referral to [facility] as he has no primary care doctor.  At no time while in the ED did he ever appear to be in any acute distress.  He is given return precautions.    Risk  Prescription drug management.                                      Clinical Impression:  Final diagnoses:  [S39.012A] Strain of lumbar region, initial encounter (Primary)  [S16.1XXA] Strain of neck muscle, initial encounter  [S39.012A] Back strain, initial encounter          ED Disposition Condition    Discharge Stable          ED Prescriptions       Medication Sig Dispense Start Date End Date Auth. Provider    methocarbamoL (ROBAXIN) 500 MG Tab Take 1 tablet (500 mg total) by mouth 3 (three) times daily. for 5 days 15 tablet 12/17/2024 12/22/2024 Roxann Zaldivar NP    diclofenac (VOLTAREN) 50 MG EC tablet Take 1 tablet (50 mg total) by mouth 3 (three) times daily as needed. 20 tablet 12/17/2024 -- Roxann Zaldivar NP          Follow-up Information       Follow up With Specialties Details Why Contact Info    Edel Cruz NP General Practice, Family Medicine In 3 days  [address] [address] [address] [phone]               Roxann Zaldivar NP  12/17/24 [id number]

## 2024-12-17 NOTE — Clinical Note
"Tod Schulz" Beata was seen and treated in our emergency department on 12/17/2024.  He may return to work on 12/23/2024.       If you have any questions or concerns, please don't hesitate to call.      Roxann Zaldivar NP"

## 2024-12-30 ENCOUNTER — HOSPITAL ENCOUNTER (EMERGENCY)
Facility: HOSPITAL | Age: 19
Discharge: HOME | End: 2024-12-30
Attending: EMERGENCY MEDICINE

## 2024-12-30 VITALS
HEIGHT: 70 IN | BODY MASS INDEX: 20.04 KG/M2 | DIASTOLIC BLOOD PRESSURE: 63 MMHG | RESPIRATION RATE: 18 BRPM | OXYGEN SATURATION: 96 % | SYSTOLIC BLOOD PRESSURE: 118 MMHG | HEART RATE: 69 BPM | WEIGHT: 140 LBS | TEMPERATURE: 98 F

## 2024-12-30 DIAGNOSIS — S49.92XA INJURY OF LEFT SHOULDER, INITIAL ENCOUNTER: ICD-10-CM

## 2024-12-30 DIAGNOSIS — M25.512 ACUTE PAIN OF LEFT SHOULDER: Primary | ICD-10-CM

## 2024-12-30 DIAGNOSIS — S46.912A STRAIN OF LEFT SHOULDER, INITIAL ENCOUNTER: ICD-10-CM

## 2024-12-30 PROCEDURE — 99284 EMERGENCY DEPT VISIT MOD MDM: CPT | Mod: 25

## 2024-12-30 RX ORDER — METHOCARBAMOL 500 MG/1
1000 TABLET, FILM COATED ORAL 3 TIMES DAILY PRN
Qty: 30 TABLET | Refills: 0 | Status: SHIPPED | OUTPATIENT
Start: 2024-12-30

## 2024-12-30 RX ORDER — NAPROXEN 500 MG/1
500 TABLET ORAL 2 TIMES DAILY PRN
Qty: 30 TABLET | Refills: 0 | Status: SHIPPED | OUTPATIENT
Start: 2024-12-30

## 2024-12-30 NOTE — DISCHARGE INSTRUCTIONS

## 2024-12-30 NOTE — Clinical Note
"Tod Pereyra (Ian)ight was seen and treated in our emergency department on 12/30/2024.  He may return to work on 12/31/2024.       If you have any questions or concerns, please don't hesitate to call.      ADRIANA Jack RN    "

## 2024-12-30 NOTE — ED PROVIDER NOTES
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  He is left arm dominant and is having pain.  Has pain with lifting his arm.       27-year-old male with a past medical history of ADHD presents to ED for left shoulder pain.  Patient states it occurred yesterday when he was playing baseball throwing the ball.  Patient complaining stabbing intermittent 10/10 pain only with movement.  No medication prior to arrival.  Patient denies fever, sweats, chills, swelling, color changes, numbness, and tingling.      Review of patient's allergies indicates:  No Known Allergies  Past Medical History:   Diagnosis Date    ADHD     ADHD (attention deficit hyperactivity disorder)     H/O umbilical hernia repair     at age 1     Past Surgical History:   Procedure Laterality Date    CIRCUMCISION, NON-       Family History   Problem Relation Name Age of Onset    No Known Problems Mother      No Known Problems Father      Hypertension Maternal Grandmother      Diabetes Maternal Grandmother      Congenital heart disease Neg Hx      Heart attacks under age 50 Neg Hx      Sudden death Neg Hx       Social History     Tobacco Use    Smoking status: Some Days     Types: Cigarettes    Smokeless tobacco: Never   Substance Use Topics    Alcohol use: Never    Drug use: Yes     Types: Marijuana     Review of Systems   Constitutional:  Negative for chills, diaphoresis and fever.   Respiratory:  Negative for shortness of breath.    Cardiovascular:  Negative for chest pain and palpitations.   Gastrointestinal:  Negative for nausea and vomiting.   Musculoskeletal:  Positive for arthralgias (left shoulder). Negative for joint swelling.   Skin:  Negative for color change and wound.   Neurological:  Negative for dizziness, weakness, light-headedness and numbness.        (-) paresthesia       Physical Exam     Initial Vitals  [12/30/24 1247]   BP Pulse Resp Temp SpO2   120/74 (!) 118 18 98.3 °F (36.8 °C) (!) 94 %      MAP       --         Physical Exam    Nursing note and vitals reviewed.  Constitutional: He appears well-developed and well-nourished. He is not diaphoretic. He is active. He does not appear ill. No distress.   HENT:   Head: Normocephalic and atraumatic.   Right Ear: External ear normal.   Left Ear: External ear normal.   Nose: Nose normal.   Eyes: Lids are normal. Pupils are equal, round, and reactive to light. Right eye exhibits no discharge. Left eye exhibits no discharge. No scleral icterus.   Neck: Phonation normal. Neck supple.   Normal range of motion.   Full passive range of motion without pain.     Cardiovascular:  Regular rhythm.   Tachycardia present.         Pulses:       Radial pulses are 2+ on the left side.   Pulmonary/Chest: Effort normal and breath sounds normal. No respiratory distress.   Abdominal: He exhibits no distension.   Musculoskeletal:         General: Normal range of motion.      Left shoulder: No swelling, deformity, effusion, laceration, tenderness, bony tenderness or crepitus. Normal range of motion.      Left upper arm: Normal.      Left elbow: Normal.      Left forearm: Normal.      Left wrist: Normal.      Left hand: Normal.      Cervical back: Full passive range of motion without pain, normal range of motion and neck supple.      Comments: pain with left shoulder range of motion.  Normal range motion.  Neurovascularly intact.  No erythema, warmth, swelling, or deformities appreciated.  No bony tenderness.     Neurological: He is alert and oriented to person, place, and time. He has normal strength. No sensory deficit. GCS eye subscore is 4. GCS verbal subscore is 5. GCS motor subscore is 6.   Skin: Skin is dry. Capillary refill takes less than 2 seconds.         ED Course   Procedures  Labs Reviewed - No data to display       Imaging Results              X-Ray Shoulder 2 or More Views Left  (Final result)  Result time 12/30/24 14:49:48      Final result by Enma Ibarra MD (12/30/24 14:49:48)                   Impression:      No acute fracture or dislocation.      Electronically signed by: Enma Ibarra  Date:    12/30/2024  Time:    14:49               Narrative:    CLINICAL HISTORY:  (MRN 20 y/o  (2005) M    pain;    TECHNIQUE:  (A# [id number], Exam time 12/30/2024 14:37)    [id number] XR SHOULDER COMPLETE 2 OR MORE VIEWS LEFT  view(s) obtained.    COMPARISON:  None available.    FINDINGS:  No acute fracture or dislocation. The joints and interspaces appear to be maintained.  Soft tissues appear radiographically within normal limits and no radiopaque foreign body is seen.                                       Medications - No data to display  Medical Decision Making  27-year-old male with a past medical history of ADHD presents to ED for left shoulder pain.  Patient's chart and medical history reviewed.    Ddx:  Fracture  Dislocation  Contusion  Strain  Rotator cuff injury    Patient's vitals reviewed.  Afebrile, no respiratory distress, and nontoxic-appearing in the ED. patient's physical exam was overall unremarkable besides pain with left shoulder range of motion.  Normal range motion.  Neurovascularly intact.  No erythema, warmth, swelling, or deformities appreciated.  No bony tenderness.  Patient denied pain medication.  Patient is tachycardic, but denies chest pain, shortness breath, dizziness, lightheadedness or palpitations.  Patient does admit to smoking marijuana prior to coming to the ER.  This is most likely the reason for his tachycardia.  We will repeat vitals. Repeat vitals show normal O2 and heart rate. Shoulder x-ray was unremarkable per my personal interpretation. Official x-ray interpretation showed No acute fracture or dislocation.  Discussed results with patient including no acute findings, he verbalized understanding.  Discussed with patient this could be due  to a shoulder strain or rotator cuff injury, he verbalized understanding.  Instructed patient to rest, ice, and heat.  Referral sent to orthopedics for further management if symptoms persist for possible need for MRI.  Discussed with patient to avoid throwing baseball until this heals or he is cleared by Orthopedics, he verbalized understanding.  Patient was sent home on naproxen and Robaxin for symptomatic control.  Patient will follow-up with ortho. Patient agrees with this plan. Discussed with him strict return precautions, he verbalized understanding. Patient is stable for discharge.     Amount and/or Complexity of Data Reviewed  External Data Reviewed: labs, radiology and notes.  Radiology: ordered.    Risk  Prescription drug management.               ED Course as of 12/30/24 1502   Mon Dec 30, 2024   1351 Attending Note:    I discussed the patient's care with Advanced Practice Clinician.  I reviewed their note and agree with the history, physical, assessment, diagnosis, treatment, all procedures performed, xray and EKG interpretations and discharge plan provided by the Advanced Practice Clinician. My overall impression is left shoulder pain.  The patient has been instructed to follow up with their physician or the one provided as well as specific return precautions.   Niki Lerner M.D. 12/30/2024 1:51 PM     [RM]      ED Course User Index  [RM] Niki Lerner MD                           Clinical Impression:  Final diagnoses:  [M25.512] Acute pain of left shoulder (Primary)  [S46.912A] Strain of left shoulder, initial encounter  [S49.92XA] Injury of left shoulder, initial encounter          ED Disposition Condition    Discharge Stable          ED Prescriptions       Medication Sig Dispense Start Date End Date Auth. Provider    naproxen (NAPROSYN) 500 MG tablet Take 1 tablet (500 mg total) by mouth 2 (two) times daily as needed (Pain). 30 tablet 12/30/2024 -- Holdsworth, Alayna, PA-C    methocarbamoL  (ROBAXIN) 500 MG Tab Take 2 tablets (1,000 mg total) by mouth 3 (three) times daily as needed (Pain). 30 tablet 12/30/2024 -- Holdsworth, Alayna, PA-C          Follow-up Information       Follow up With Specialties Details Why Contact Info    Edel Cruz, NP General Practice, Family Medicine Call   [id number] [address] [address] [phone]               Holdsworth, Alayna, PA-C  12/30/24 0071